# Patient Record
Sex: MALE | Race: ASIAN
[De-identification: names, ages, dates, MRNs, and addresses within clinical notes are randomized per-mention and may not be internally consistent; named-entity substitution may affect disease eponyms.]

---

## 2018-02-19 ENCOUNTER — HOSPITAL ENCOUNTER (EMERGENCY)
Dept: HOSPITAL 54 - ER | Age: 33
Discharge: HOME | End: 2018-02-19
Payer: COMMERCIAL

## 2018-02-19 VITALS — SYSTOLIC BLOOD PRESSURE: 124 MMHG | DIASTOLIC BLOOD PRESSURE: 60 MMHG

## 2018-02-19 VITALS — HEIGHT: 69 IN | WEIGHT: 140 LBS | BODY MASS INDEX: 20.73 KG/M2
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
## 2018-02-19 DIAGNOSIS — S90.31XA: ICD-10-CM
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
## 2018-02-19 DIAGNOSIS — S93.601A: Primary | ICD-10-CM
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
## 2018-02-19 PROCEDURE — A4606 OXYGEN PROBE USED W OXIMETER: HCPCS

## 2018-02-19 PROCEDURE — Z7610: HCPCS

## 2018-02-19 PROCEDURE — 99284 EMERGENCY DEPT VISIT MOD MDM: CPT

## 2018-02-19 PROCEDURE — 73630 X-RAY EXAM OF FOOT: CPT

## 2018-02-19 NOTE — NUR
PT BIB FRIEND, PT AMBULATORY TO ROOM WITH CRUTCHES. PT C/O RIGHT FOOT PAIN S/P 
JUMPING OFF SOMETHING AND LANDING ON RIGHT FOOT 2 HOURS PTA. PT AOX3 RR EVEN 
AND UNLABORED. NO SOB NOTED. NAD NOTED. NO NVD AT THIS TIME. PT WAITING FOR MD CORTES.

## 2018-02-19 NOTE — NUR
Patient discharged to home in stable condition. Written and verbal after care 
instructions given. Patient verbalizes understanding of instruction. PT GIVEN 
PRESCRIPTION FOR [employer] AND PT TOLD TO NOT DRIVE OR WORK WHILE TAKING [employer] PT 
VERBALLY VERIFIED INSTRUCTIONS